# Patient Record
Sex: MALE | Race: WHITE | NOT HISPANIC OR LATINO | Employment: FULL TIME | URBAN - METROPOLITAN AREA
[De-identification: names, ages, dates, MRNs, and addresses within clinical notes are randomized per-mention and may not be internally consistent; named-entity substitution may affect disease eponyms.]

---

## 2024-10-22 ENCOUNTER — LAB REQUISITION (OUTPATIENT)
Dept: DERMATOPATHOLOGY | Facility: CLINIC | Age: 59
End: 2024-10-22
Payer: COMMERCIAL

## 2024-10-22 DIAGNOSIS — C43.59 MALIGNANT MELANOMA OF OTHER PART OF TRUNK: ICD-10-CM

## 2024-10-22 PROCEDURE — 88321 CONSLTJ&REPRT SLD PREP ELSWR: CPT | Performed by: DERMATOLOGY

## 2024-10-23 NOTE — PROGRESS NOTES
Assessment and Plan:  Obie Mabry is a 59 y.o. male referred by Salvador Delgado with a newly diagnosed melanoma of the right upper back that is at least 1.2 mm thick, pT2a. After discussing the risks and benefits of wide local excision with sentinel lymph node biopsy the patient understands and would like to proceed. We will look for OR time at [facility] in the coming weeks. I believe this will close primarily with a 1 cm margin.     I spent 60 minutes in the professional and overall care of this patient.    Rayray Piedra MD  Assistant Professor of Surgery  Division of Surgical Oncology  [phone]  [email or web address]      History Of Present Illness  Referring provider: Salvador Delgado  Diagnosis: melanoma  Location: right upper back  Thickness: at least 1.2 mm  Margins: positive deep and peripheral  Subtype: ss  High-risk features: none    All other systems have been reviewed and are negative except as noted in the HPI.    I personally reviewed all necessary laboratory results, pathology reports, and radiologic images for this patient.    Past Medical History  He has no past medical history on file.    Surgical History  He has no past surgical history on file.     Social History  He has no history on file for tobacco use, alcohol use, and drug use.    Family History  No family history on file.     Allergies  Patient has no allergy information on record.     Last Recorded Vitals  There were no vitals taken for this visit.    Physical Exam  General: no acute distress, well-nourished  Eyes: intact EOM, no scleral icterus  ENT: hearing intact, no drainage  Respiratory: symmetric chest rise, no cough  Cardiovascular: intact distal pulses, no pitting edema  Abdominal: Soft, nontender  Musculoskeletal: no deformities, intact strength  Integumentary: Healing biopsy site, no lymphadenopathy  Neuro: no focal deficits, sensation intact  Psych: normal mood and affect     Relevant Results  FINAL DIAGNOSIS   3  SLIDES, [facility], #[id number] (BX: 10/08/2024)     SKIN, RIGHT SUPERIOR UPPER BACK, SHAVE BIOPSY:  MALIGNANT MELANOMA, BRESLOW THICKNESS AT LEAST 1.2 MM, PRESENT ON THE DEEP AND PERIPHERAL MARGIN, SEE NOTE.     Note:  Microscopic examination reveals a specimen that extends into the superficial to mid dermis.  There is an asymmetric and poorly circumscribed proliferation of nested and single atypical melanocytes throughout all layers of the epidermis.  There are nests of atypical melanocytes in the dermis that have moderately  enlarged nuclei with moderate cytoplasm.  There are areas of papillary dermal edema with a lymphocytic infiltrate and edema.  The melanocytes stain with antibodies against SOX-10 and PRAME.       ** Electronically signed out by Bony Marley MD **            Electronically signed by Bony Marley MD on 10/24/2024 at 1158   Case Summary Report   MELANOMA OF THE SKIN: Biopsy   8th Edition - Protocol posted: 3/23/2022MELANOMA OF THE SKIN: BIOPSY - All Specimens  SPECIMEN   Procedure  Biopsy, shave   Specimen Laterality  Right   TUMOR   Tumor Site  Skin of trunk: Right superior upper back        Histologic Type  Superficial spreading melanoma (low-cumulative sun damage (CSD) melanoma)   Maximum Tumor (Breslow) Thickness (Millimeters)  At least: 1.2  mm     Moderately transected on the deep margin.   Ulceration  Not identified   Anatomic (Christoph) Level  At least level: IV     Moderately transected on the deep margin.   Mitotic Rate  None identified   Microsatellite(s)  Not identified   Lymphovascular Invasion  Not identified   Neurotropism  Not identified   Tumor-Infiltrating Lymphocytes  Present, nonbrisk   Tumor Regression  Present   MARGINS     Margin Status for Invasive Melanoma  Invasive melanoma present at margin   Margin(s) Involved by Invasive Melanoma  Deep   Margin Status for Melanoma in situ  Melanoma in situ present at margin   Margin(s) Involved by  Melanoma in Situ  Peripheral     Deep   PATHOLOGIC STAGE CLASSIFICATION (pTNM, AJCC 8th Edition)     pT Category  pT2a

## 2024-10-23 NOTE — H&P (VIEW-ONLY)
Assessment and Plan:  Obie Mabry is a 59 y.o. male referred by Salvador Delgado with a newly diagnosed melanoma of the right upper back that is at least 1.2 mm thick, pT2a. After discussing the risks and benefits of wide local excision with sentinel lymph node biopsy the patient understands and would like to proceed. We will look for OR time at [facility] in the coming weeks. I believe this will close primarily with a 1 cm margin.     I spent 60 minutes in the professional and overall care of this patient.    Rayray Piedra MD  Assistant Professor of Surgery  Division of Surgical Oncology  [phone]  [email or web address]      History Of Present Illness  Referring provider: Salvador Delgado  Diagnosis: melanoma  Location: right upper back  Thickness: at least 1.2 mm  Margins: positive deep and peripheral  Subtype: ss  High-risk features: none    All other systems have been reviewed and are negative except as noted in the HPI.    I personally reviewed all necessary laboratory results, pathology reports, and radiologic images for this patient.    Past Medical History  He has no past medical history on file.    Surgical History  He has no past surgical history on file.     Social History  He has no history on file for tobacco use, alcohol use, and drug use.    Family History  No family history on file.     Allergies  Patient has no allergy information on record.     Last Recorded Vitals  There were no vitals taken for this visit.    Physical Exam  General: no acute distress, well-nourished  Eyes: intact EOM, no scleral icterus  ENT: hearing intact, no drainage  Respiratory: symmetric chest rise, no cough  Cardiovascular: intact distal pulses, no pitting edema  Abdominal: Soft, nontender  Musculoskeletal: no deformities, intact strength  Integumentary: Healing biopsy site, no lymphadenopathy  Neuro: no focal deficits, sensation intact  Psych: normal mood and affect     Relevant Results  FINAL DIAGNOSIS   3  SLIDES, [facility], #[id number] (BX: 10/08/2024)     SKIN, RIGHT SUPERIOR UPPER BACK, SHAVE BIOPSY:  MALIGNANT MELANOMA, BRESLOW THICKNESS AT LEAST 1.2 MM, PRESENT ON THE DEEP AND PERIPHERAL MARGIN, SEE NOTE.     Note:  Microscopic examination reveals a specimen that extends into the superficial to mid dermis.  There is an asymmetric and poorly circumscribed proliferation of nested and single atypical melanocytes throughout all layers of the epidermis.  There are nests of atypical melanocytes in the dermis that have moderately  enlarged nuclei with moderate cytoplasm.  There are areas of papillary dermal edema with a lymphocytic infiltrate and edema.  The melanocytes stain with antibodies against SOX-10 and PRAME.       ** Electronically signed out by Bony Marley MD **            Electronically signed by Bony Marley MD on 10/24/2024 at 1158   Case Summary Report   MELANOMA OF THE SKIN: Biopsy   8th Edition - Protocol posted: 3/23/2022MELANOMA OF THE SKIN: BIOPSY - All Specimens  SPECIMEN   Procedure  Biopsy, shave   Specimen Laterality  Right   TUMOR   Tumor Site  Skin of trunk: Right superior upper back        Histologic Type  Superficial spreading melanoma (low-cumulative sun damage (CSD) melanoma)   Maximum Tumor (Breslow) Thickness (Millimeters)  At least: 1.2  mm     Moderately transected on the deep margin.   Ulceration  Not identified   Anatomic (Christoph) Level  At least level: IV     Moderately transected on the deep margin.   Mitotic Rate  None identified   Microsatellite(s)  Not identified   Lymphovascular Invasion  Not identified   Neurotropism  Not identified   Tumor-Infiltrating Lymphocytes  Present, nonbrisk   Tumor Regression  Present   MARGINS     Margin Status for Invasive Melanoma  Invasive melanoma present at margin   Margin(s) Involved by Invasive Melanoma  Deep   Margin Status for Melanoma in situ  Melanoma in situ present at margin   Margin(s) Involved by  Melanoma in Situ  Peripheral     Deep   PATHOLOGIC STAGE CLASSIFICATION (pTNM, AJCC 8th Edition)     pT Category  pT2a

## 2024-10-24 ENCOUNTER — OFFICE VISIT (OUTPATIENT)
Dept: SURGICAL ONCOLOGY | Facility: HOSPITAL | Age: 59
End: 2024-10-24
Payer: COMMERCIAL

## 2024-10-24 VITALS
RESPIRATION RATE: 20 BRPM | OXYGEN SATURATION: 100 % | TEMPERATURE: 97 F | WEIGHT: 274.47 LBS | SYSTOLIC BLOOD PRESSURE: 142 MMHG | HEART RATE: 100 BPM | DIASTOLIC BLOOD PRESSURE: 73 MMHG

## 2024-10-24 DIAGNOSIS — C43.59 MELANOMA OF BACK (MULTI): Primary | ICD-10-CM

## 2024-10-24 LAB
PATH REPORT.FINAL DX SPEC: NORMAL
PATH REPORT.GROSS SPEC: NORMAL
PATH REPORT.RELEVANT HX SPEC: NORMAL
PATH REPORT.TOTAL CANCER: NORMAL
PATHOLOGY SYNOPTIC REPORT: NORMAL

## 2024-10-24 PROCEDURE — 99214 OFFICE O/P EST MOD 30 MIN: CPT | Mod: 57 | Performed by: STUDENT IN AN ORGANIZED HEALTH CARE EDUCATION/TRAINING PROGRAM

## 2024-10-24 PROCEDURE — 4004F PT TOBACCO SCREEN RCVD TLK: CPT | Performed by: STUDENT IN AN ORGANIZED HEALTH CARE EDUCATION/TRAINING PROGRAM

## 2024-10-24 PROCEDURE — 99204 OFFICE O/P NEW MOD 45 MIN: CPT | Performed by: STUDENT IN AN ORGANIZED HEALTH CARE EDUCATION/TRAINING PROGRAM

## 2024-10-24 ASSESSMENT — PATIENT HEALTH QUESTIONNAIRE - PHQ9
SUM OF ALL RESPONSES TO PHQ9 QUESTIONS 1 AND 2: 0
1. LITTLE INTEREST OR PLEASURE IN DOING THINGS: NOT AT ALL
2. FEELING DOWN, DEPRESSED OR HOPELESS: NOT AT ALL

## 2024-10-24 ASSESSMENT — PAIN SCALES - GENERAL: PAINLEVEL_OUTOF10: 6

## 2024-10-24 NOTE — LETTER
October 24, 2024     Salvador Delgado MD  [address]  [address]  [facility] [address]    Patient: Obie Mabry   YOB: 1965   Date of Visit: 10/24/2024       Dear Dr. Salvador Delgado MD:    Thank you for referring Obie Mabry to me for evaluation. Below are my notes for this consultation.  If you have questions, please do not hesitate to call me. I look forward to following your patient along with you.       Sincerely,     Rayray Piedra MD      CC: No Recipients  ______________________________________________________________________________________    Assessment and Plan:  Obie Mabry is a 59 y.o. male referred by Salvador Delgado with a newly diagnosed melanoma of the right upper back that is at least 1.2 mm thick, pT2a. After discussing the risks and benefits of wide local excision with sentinel lymph node biopsy the patient understands and would like to proceed. We will look for OR time at [place] in the coming weeks. I believe this will close primarily with a 1 cm margin.     I spent 60 minutes in the professional and overall care of this patient.    Rayray Piedra MD  Assistant Professor of Surgery  Division of Surgical Oncology  [phone]  [email or web address]      History Of Present Illness  Referring provider: Salvador Delgado  Diagnosis: melanoma  Location: right upper back  Thickness: at least 1.2 mm  Margins: positive deep and peripheral  Subtype: ss  High-risk features: none    All other systems have been reviewed and are negative except as noted in the HPI.    I personally reviewed all necessary laboratory results, pathology reports, and radiologic images for this patient.    Past Medical History  He has no past medical history on file.    Surgical History  He has no past surgical history on file.     Social History  He has no history on file for tobacco use, alcohol use, and drug use.    Family History  No family history on file.     Allergies  Patient has no  allergy information on record.     Last Recorded Vitals  There were no vitals taken for this visit.    Physical Exam  General: no acute distress, well-nourished  Eyes: intact EOM, no scleral icterus  ENT: hearing intact, no drainage  Respiratory: symmetric chest rise, no cough  Cardiovascular: intact distal pulses, no pitting edema  Abdominal: Soft, nontender  Musculoskeletal: no deformities, intact strength  Integumentary: Healing biopsy site, no lymphadenopathy  Neuro: no focal deficits, sensation intact  Psych: normal mood and affect     Relevant Results  FINAL DIAGNOSIS   3 SLIDES, [facility], #[id number] (BX: 10/08/2024)     SKIN, RIGHT SUPERIOR UPPER BACK, SHAVE BIOPSY:  MALIGNANT MELANOMA, BRESLOW THICKNESS AT LEAST 1.2 MM, PRESENT ON THE DEEP AND PERIPHERAL MARGIN, SEE NOTE.     Note:  Microscopic examination reveals a specimen that extends into the superficial to mid dermis.  There is an asymmetric and poorly circumscribed proliferation of nested and single atypical melanocytes throughout all layers of the epidermis.  There are nests of atypical melanocytes in the dermis that have moderately  enlarged nuclei with moderate cytoplasm.  There are areas of papillary dermal edema with a lymphocytic infiltrate and edema.  The melanocytes stain with antibodies against SOX-10 and PRAME.       ** Electronically signed out by Bony Marley MD **            Electronically signed by Bony Marley MD on 10/24/2024 at 1158   Case Summary Report   MELANOMA OF THE SKIN: Biopsy   8th Edition - Protocol posted: 3/23/2022MELANOMA OF THE SKIN: BIOPSY - All Specimens  SPECIMEN   Procedure  Biopsy, shave   Specimen Laterality  Right   TUMOR   Tumor Site  Skin of trunk: Right superior upper back        Histologic Type  Superficial spreading melanoma (low-cumulative sun damage (CSD) melanoma)   Maximum Tumor (Breslow) Thickness (Millimeters)  At least: 1.2  mm     Moderately transected on the deep  margin.   Ulceration  Not identified   Anatomic (Christoph) Level  At least level: IV     Moderately transected on the deep margin.   Mitotic Rate  None identified   Microsatellite(s)  Not identified   Lymphovascular Invasion  Not identified   Neurotropism  Not identified   Tumor-Infiltrating Lymphocytes  Present, nonbrisk   Tumor Regression  Present   MARGINS     Margin Status for Invasive Melanoma  Invasive melanoma present at margin   Margin(s) Involved by Invasive Melanoma  Deep   Margin Status for Melanoma in situ  Melanoma in situ present at margin   Margin(s) Involved by Melanoma in Situ  Peripheral     Deep   PATHOLOGIC STAGE CLASSIFICATION (pTNM, AJCC 8th Edition)     pT Category  pT2a

## 2024-10-28 ENCOUNTER — TUMOR BOARD CONFERENCE (OUTPATIENT)
Dept: HEMATOLOGY/ONCOLOGY | Facility: HOSPITAL | Age: 59
End: 2024-10-28
Payer: COMMERCIAL

## 2024-10-30 RX ORDER — ASPIRIN 81 MG/1
81 TABLET ORAL DAILY
COMMUNITY

## 2024-10-30 RX ORDER — PRAVASTATIN SODIUM 80 MG/1
1 TABLET ORAL NIGHTLY
COMMUNITY
Start: 2024-06-16

## 2024-10-30 RX ORDER — ACETAMINOPHEN 500 MG
2000 TABLET ORAL DAILY
COMMUNITY

## 2024-10-30 RX ORDER — TRIAMTERENE AND HYDROCHLOROTHIAZIDE 37.5; 25 MG/1; MG/1
1 CAPSULE ORAL
COMMUNITY
Start: 2023-09-20

## 2024-10-30 RX ORDER — IBUPROFEN 800 MG/1
800 TABLET ORAL EVERY 6 HOURS PRN
COMMUNITY

## 2024-10-30 RX ORDER — UBIDECARENONE 30 MG
30 CAPSULE ORAL DAILY
COMMUNITY

## 2024-10-30 NOTE — PREPROCEDURE INSTRUCTIONS

## 2024-11-04 ENCOUNTER — HOSPITAL ENCOUNTER (OUTPATIENT)
Dept: RADIOLOGY | Facility: HOSPITAL | Age: 59
Setting detail: OUTPATIENT SURGERY
Discharge: HOME | End: 2024-11-04
Payer: COMMERCIAL

## 2024-11-04 ENCOUNTER — APPOINTMENT (OUTPATIENT)
Dept: CARDIOLOGY | Facility: HOSPITAL | Age: 59
End: 2024-11-04
Payer: COMMERCIAL

## 2024-11-04 ENCOUNTER — ANESTHESIA (OUTPATIENT)
Dept: OPERATING ROOM | Facility: HOSPITAL | Age: 59
End: 2024-11-04
Payer: COMMERCIAL

## 2024-11-04 ENCOUNTER — HOSPITAL ENCOUNTER (OUTPATIENT)
Facility: HOSPITAL | Age: 59
Setting detail: OUTPATIENT SURGERY
Discharge: HOME | End: 2024-11-04
Attending: STUDENT IN AN ORGANIZED HEALTH CARE EDUCATION/TRAINING PROGRAM | Admitting: STUDENT IN AN ORGANIZED HEALTH CARE EDUCATION/TRAINING PROGRAM
Payer: COMMERCIAL

## 2024-11-04 ENCOUNTER — ANESTHESIA EVENT (OUTPATIENT)
Dept: OPERATING ROOM | Facility: HOSPITAL | Age: 59
End: 2024-11-04
Payer: COMMERCIAL

## 2024-11-04 VITALS
HEART RATE: 88 BPM | HEIGHT: 68 IN | WEIGHT: 272.05 LBS | DIASTOLIC BLOOD PRESSURE: 68 MMHG | SYSTOLIC BLOOD PRESSURE: 169 MMHG | BODY MASS INDEX: 41.23 KG/M2 | TEMPERATURE: 96.8 F | OXYGEN SATURATION: 97 % | RESPIRATION RATE: 18 BRPM

## 2024-11-04 DIAGNOSIS — C43.59 MELANOMA OF BACK (MULTI): ICD-10-CM

## 2024-11-04 DIAGNOSIS — C43.59 MELANOMA OF BACK (MULTI): Primary | ICD-10-CM

## 2024-11-04 PROBLEM — I10 HTN (HYPERTENSION): Status: ACTIVE | Noted: 2024-11-04

## 2024-11-04 PROBLEM — E78.5 HYPERLIPIDEMIA: Status: ACTIVE | Noted: 2024-11-04

## 2024-11-04 PROCEDURE — 3700000001 HC GENERAL ANESTHESIA TIME - INITIAL BASE CHARGE: Performed by: STUDENT IN AN ORGANIZED HEALTH CARE EDUCATION/TRAINING PROGRAM

## 2024-11-04 PROCEDURE — 2500000004 HC RX 250 GENERAL PHARMACY W/ HCPCS (ALT 636 FOR OP/ED)

## 2024-11-04 PROCEDURE — 7100000002 HC RECOVERY ROOM TIME - EACH INCREMENTAL 1 MINUTE: Performed by: STUDENT IN AN ORGANIZED HEALTH CARE EDUCATION/TRAINING PROGRAM

## 2024-11-04 PROCEDURE — 3600000003 HC OR TIME - INITIAL BASE CHARGE - PROCEDURE LEVEL THREE: Performed by: STUDENT IN AN ORGANIZED HEALTH CARE EDUCATION/TRAINING PROGRAM

## 2024-11-04 PROCEDURE — 7100000001 HC RECOVERY ROOM TIME - INITIAL BASE CHARGE: Performed by: STUDENT IN AN ORGANIZED HEALTH CARE EDUCATION/TRAINING PROGRAM

## 2024-11-04 PROCEDURE — 38525 BIOPSY/REMOVAL LYMPH NODES: CPT | Performed by: STUDENT IN AN ORGANIZED HEALTH CARE EDUCATION/TRAINING PROGRAM

## 2024-11-04 PROCEDURE — 2720000007 HC OR 272 NO HCPCS: Performed by: STUDENT IN AN ORGANIZED HEALTH CARE EDUCATION/TRAINING PROGRAM

## 2024-11-04 PROCEDURE — 3700000002 HC GENERAL ANESTHESIA TIME - EACH INCREMENTAL 1 MINUTE: Performed by: STUDENT IN AN ORGANIZED HEALTH CARE EDUCATION/TRAINING PROGRAM

## 2024-11-04 PROCEDURE — 3600000008 HC OR TIME - EACH INCREMENTAL 1 MINUTE - PROCEDURE LEVEL THREE: Performed by: STUDENT IN AN ORGANIZED HEALTH CARE EDUCATION/TRAINING PROGRAM

## 2024-11-04 PROCEDURE — 13101 CMPLX RPR TRUNK 2.6-7.5 CM: CPT | Performed by: STUDENT IN AN ORGANIZED HEALTH CARE EDUCATION/TRAINING PROGRAM

## 2024-11-04 PROCEDURE — 78830 RP LOCLZJ TUM SPECT W/CT 1: CPT

## 2024-11-04 PROCEDURE — 7100000009 HC PHASE TWO TIME - INITIAL BASE CHARGE: Performed by: STUDENT IN AN ORGANIZED HEALTH CARE EDUCATION/TRAINING PROGRAM

## 2024-11-04 PROCEDURE — 93005 ELECTROCARDIOGRAM TRACING: CPT

## 2024-11-04 PROCEDURE — 38900 IO MAP OF SENT LYMPH NODE: CPT | Performed by: STUDENT IN AN ORGANIZED HEALTH CARE EDUCATION/TRAINING PROGRAM

## 2024-11-04 PROCEDURE — 2500000005 HC RX 250 GENERAL PHARMACY W/O HCPCS: Performed by: STUDENT IN AN ORGANIZED HEALTH CARE EDUCATION/TRAINING PROGRAM

## 2024-11-04 PROCEDURE — 2500000004 HC RX 250 GENERAL PHARMACY W/ HCPCS (ALT 636 FOR OP/ED): Performed by: STUDENT IN AN ORGANIZED HEALTH CARE EDUCATION/TRAINING PROGRAM

## 2024-11-04 PROCEDURE — 3430000001 HC RX 343 DIAGNOSTIC RADIOPHARMACEUTICALS: Performed by: INTERNAL MEDICINE

## 2024-11-04 PROCEDURE — A9520 TC99 TILMANOCEPT DIAG 0.5MCI: HCPCS | Performed by: INTERNAL MEDICINE

## 2024-11-04 PROCEDURE — 7100000010 HC PHASE TWO TIME - EACH INCREMENTAL 1 MINUTE: Performed by: STUDENT IN AN ORGANIZED HEALTH CARE EDUCATION/TRAINING PROGRAM

## 2024-11-04 PROCEDURE — 2500000004 HC RX 250 GENERAL PHARMACY W/ HCPCS (ALT 636 FOR OP/ED): Performed by: ANESTHESIOLOGY

## 2024-11-04 PROCEDURE — 2500000005 HC RX 250 GENERAL PHARMACY W/O HCPCS: Performed by: ANESTHESIOLOGY

## 2024-11-04 PROCEDURE — 11606 EXC TR-EXT MAL+MARG >4 CM: CPT | Performed by: STUDENT IN AN ORGANIZED HEALTH CARE EDUCATION/TRAINING PROGRAM

## 2024-11-04 PROCEDURE — 13102 CMPLX RPR TRUNK ADDL 5CM/<: CPT | Performed by: STUDENT IN AN ORGANIZED HEALTH CARE EDUCATION/TRAINING PROGRAM

## 2024-11-04 RX ORDER — SUCCINYLCHOLINE CHLORIDE 100 MG/5ML
SYRINGE (ML) INTRAVENOUS AS NEEDED
Status: DISCONTINUED | OUTPATIENT
Start: 2024-11-04 | End: 2024-11-04

## 2024-11-04 RX ORDER — ROCURONIUM BROMIDE 10 MG/ML
INJECTION, SOLUTION INTRAVENOUS AS NEEDED
Status: DISCONTINUED | OUTPATIENT
Start: 2024-11-04 | End: 2024-11-04

## 2024-11-04 RX ORDER — OXYCODONE HYDROCHLORIDE 5 MG/1
5 TABLET ORAL EVERY 4 HOURS PRN
Status: CANCELLED | OUTPATIENT
Start: 2024-11-04

## 2024-11-04 RX ORDER — ALBUTEROL SULFATE 0.83 MG/ML
2.5 SOLUTION RESPIRATORY (INHALATION) ONCE
Status: DISCONTINUED | OUTPATIENT
Start: 2024-11-04 | End: 2024-11-04 | Stop reason: HOSPADM

## 2024-11-04 RX ORDER — SODIUM CHLORIDE, SODIUM LACTATE, POTASSIUM CHLORIDE, CALCIUM CHLORIDE 600; 310; 30; 20 MG/100ML; MG/100ML; MG/100ML; MG/100ML
100 INJECTION, SOLUTION INTRAVENOUS CONTINUOUS
Status: DISCONTINUED | OUTPATIENT
Start: 2024-11-04 | End: 2024-11-04 | Stop reason: HOSPADM

## 2024-11-04 RX ORDER — CEFAZOLIN SODIUM IN 0.9 % NACL 3 G/100 ML
3 INTRAVENOUS SOLUTION, PIGGYBACK (ML) INTRAVENOUS ONCE
Status: COMPLETED | OUTPATIENT
Start: 2024-11-04 | End: 2024-11-04

## 2024-11-04 RX ORDER — FENTANYL CITRATE 50 UG/ML
INJECTION, SOLUTION INTRAMUSCULAR; INTRAVENOUS AS NEEDED
Status: DISCONTINUED | OUTPATIENT
Start: 2024-11-04 | End: 2024-11-04

## 2024-11-04 RX ORDER — SODIUM CHLORIDE 0.9 G/100ML
IRRIGANT IRRIGATION AS NEEDED
Status: DISCONTINUED | OUTPATIENT
Start: 2024-11-04 | End: 2024-11-04 | Stop reason: HOSPADM

## 2024-11-04 RX ORDER — MIDAZOLAM HYDROCHLORIDE 1 MG/ML
1 INJECTION, SOLUTION INTRAMUSCULAR; INTRAVENOUS ONCE AS NEEDED
Status: DISCONTINUED | OUTPATIENT
Start: 2024-11-04 | End: 2024-11-04 | Stop reason: HOSPADM

## 2024-11-04 RX ORDER — LABETALOL HYDROCHLORIDE 5 MG/ML
5 INJECTION, SOLUTION INTRAVENOUS ONCE AS NEEDED
Status: DISCONTINUED | OUTPATIENT
Start: 2024-11-04 | End: 2024-11-04 | Stop reason: HOSPADM

## 2024-11-04 RX ORDER — GLYCOPYRROLATE 0.2 MG/ML
INJECTION INTRAMUSCULAR; INTRAVENOUS AS NEEDED
Status: DISCONTINUED | OUTPATIENT
Start: 2024-11-04 | End: 2024-11-04

## 2024-11-04 RX ORDER — LIDOCAINE HYDROCHLORIDE 10 MG/ML
0.1 INJECTION, SOLUTION EPIDURAL; INFILTRATION; INTRACAUDAL; PERINEURAL ONCE
Status: DISCONTINUED | OUTPATIENT
Start: 2024-11-04 | End: 2024-11-04 | Stop reason: HOSPADM

## 2024-11-04 RX ORDER — TRAMADOL HYDROCHLORIDE 50 MG/1
50 TABLET ORAL 2 TIMES DAILY PRN
Qty: 5 TABLET | Refills: 0 | Status: SHIPPED | OUTPATIENT
Start: 2024-11-04 | End: 2024-11-09

## 2024-11-04 RX ORDER — CETIRIZINE HYDROCHLORIDE 10 MG/1
10 TABLET, CHEWABLE ORAL DAILY
COMMUNITY

## 2024-11-04 RX ORDER — DROPERIDOL 2.5 MG/ML
0.62 INJECTION, SOLUTION INTRAMUSCULAR; INTRAVENOUS ONCE AS NEEDED
Status: DISCONTINUED | OUTPATIENT
Start: 2024-11-04 | End: 2024-11-04 | Stop reason: HOSPADM

## 2024-11-04 RX ORDER — ONDANSETRON HYDROCHLORIDE 2 MG/ML
INJECTION, SOLUTION INTRAVENOUS AS NEEDED
Status: DISCONTINUED | OUTPATIENT
Start: 2024-11-04 | End: 2024-11-04

## 2024-11-04 RX ORDER — LIDOCAINE HYDROCHLORIDE 20 MG/ML
INJECTION, SOLUTION INFILTRATION; PERINEURAL AS NEEDED
Status: DISCONTINUED | OUTPATIENT
Start: 2024-11-04 | End: 2024-11-04

## 2024-11-04 RX ORDER — MEPERIDINE HYDROCHLORIDE 25 MG/ML
12.5 INJECTION INTRAMUSCULAR; INTRAVENOUS; SUBCUTANEOUS EVERY 10 MIN PRN
Status: DISCONTINUED | OUTPATIENT
Start: 2024-11-04 | End: 2024-11-04 | Stop reason: HOSPADM

## 2024-11-04 RX ORDER — PHENYLEPHRINE HCL IN 0.9% NACL 1 MG/10 ML
SYRINGE (ML) INTRAVENOUS AS NEEDED
Status: DISCONTINUED | OUTPATIENT
Start: 2024-11-04 | End: 2024-11-04

## 2024-11-04 RX ORDER — PROPOFOL 10 MG/ML
INJECTION, EMULSION INTRAVENOUS AS NEEDED
Status: DISCONTINUED | OUTPATIENT
Start: 2024-11-04 | End: 2024-11-04

## 2024-11-04 SDOH — HEALTH STABILITY: MENTAL HEALTH: CURRENT SMOKER: 0

## 2024-11-04 ASSESSMENT — PAIN SCALES - GENERAL
PAINLEVEL_OUTOF10: 0 - NO PAIN
PAINLEVEL_OUTOF10: 6
PAIN_LEVEL: 0
PAINLEVEL_OUTOF10: 0 - NO PAIN
PAINLEVEL_OUTOF10: 0 - NO PAIN

## 2024-11-04 ASSESSMENT — PAIN - FUNCTIONAL ASSESSMENT
PAIN_FUNCTIONAL_ASSESSMENT: 0-10

## 2024-11-04 ASSESSMENT — COLUMBIA-SUICIDE SEVERITY RATING SCALE - C-SSRS
1. IN THE PAST MONTH, HAVE YOU WISHED YOU WERE DEAD OR WISHED YOU COULD GO TO SLEEP AND NOT WAKE UP?: NO
2. HAVE YOU ACTUALLY HAD ANY THOUGHTS OF KILLING YOURSELF?: NO
6. HAVE YOU EVER DONE ANYTHING, STARTED TO DO ANYTHING, OR PREPARED TO DO ANYTHING TO END YOUR LIFE?: NO

## 2024-11-04 ASSESSMENT — PAIN DESCRIPTION - DESCRIPTORS: DESCRIPTORS: SHARP;SORE

## 2024-11-04 NOTE — ANESTHESIA POSTPROCEDURE EVALUATION
Patient: Obie COLE Raghavendra    Procedure Summary       Date: 11/04/24 Room / Location: [facility] OR 10 / [facility]    Anesthesia Start: 1422 Anesthesia Stop: 1637    Procedures:       Excision Lesion Back      RIGHT AXILLARY SENTINEL LYMPH NODE BIOPSY (Right) Diagnosis:       Melanoma of back (Multi)      (Melanoma of back (Multi) [C43.59])    Surgeons: Rayray Piedra MD Responsible Provider: Johnnie Gupta MD    Anesthesia Type: general ASA Status: 2            Anesthesia Type: general    Vitals Value Taken Time   /99 11/04/24 1637   Temp 36.2 11/04/24 1637   Pulse 86 11/04/24 1637   Resp 20 11/04/24 1637   SpO2 98 11/04/24 1637       Anesthesia Post Evaluation    Patient location during evaluation: PACU  Patient participation: complete - patient participated  Level of consciousness: awake  Pain score: 0  Pain management: adequate  Multimodal analgesia pain management approach  Airway patency: patent  Cardiovascular status: acceptable  Respiratory status: acceptable and face mask  Hydration status: acceptable  Postoperative Nausea and Vomiting: none        No notable events documented.

## 2024-11-04 NOTE — DISCHARGE INSTRUCTIONS
Melanoma Surgery Discharge Instructions    Diet  No diet restrictions    Pain control  For baseline pain control: Tylenol (acetaminophen) 1,000 mg every 8 hours for one week  For mild pain: ibuprofen 600 mg every 8 hours with food as needed  For moderate to severe pain: Tramadol as prescribed    Activity  No specific lifting or activity restrictions  In general, listen to your body and do not overly stress your surgical sites    Incisions  Your incisions are covered with skin glue. This is a sterile dressing placed in the operating room. Do not pick or peel it off. This will fall off in 2-3 weeks.  No dressing changes or wound care is needed. Do not use any ointments or cleaning agents.  You have multiple layers of sutures under the skin that will dissolve.   If you have sutures through the skin they will be removed at your postoperative appointment in 3 weeks.  You may shower the day after surgery. Let soap and water wash over the incision and pat it dry.   No going underwater (bath, pool, lake, ocean, etc.) for 2 weeks.  If you notice any of the following, please call [facility] ([phone]) or Dr. Piedra's office ([phone]).  Swelling under the incision like a golf ball or larger.  Redness of the skin that is spreading away from the incision.  Drainage from the incision.  Fevers, chills, or any other concerning symptoms.    Follow-up  Call [facility] at [phone] to arrange a postop visit in 2-3 weeks  If you have sutures through your skin, these should be removed at 3 weeks  Dr. Piedra will discuss your pathology and Tumor Board recommendations at your postop visit. You may see your pathology online prior the visit. Write down any questions you have and bring them to your postop visit.

## 2024-11-04 NOTE — ANESTHESIA PROCEDURE NOTES
Airway  Date/Time: 11/4/2024 2:31 PM  Urgency: elective    Airway not difficult    Staffing  Performed: CRNA   Authorized by: Michael Maki MD    Performed by: KIRAN Purvis-NATHANIEL  Patient location during procedure: OR    Indications and Patient Condition  Indications for airway management: anesthesia and airway protection  Spontaneous Ventilation: absent  Sedation level: deep  Preoxygenated: yes  Patient position: sniffing  Mask difficulty assessment: 0 - not attempted    Final Airway Details  Final airway type: endotracheal airway      Successful airway: ETT  Cuffed: yes   Successful intubation technique: video laryngoscopy (Rader 4)  Facilitating devices/methods: intubating stylet  Endotracheal tube insertion site: oral  Blade size: #4  ETT size (mm): 8.0  Cormack-Lehane Classification: grade IIa - partial view of glottis  Placement verified by: chest auscultation and capnometry   Measured from: gums  ETT to gums (cm): 22  Number of attempts at approach: 1

## 2024-11-04 NOTE — ANESTHESIA PREPROCEDURE EVALUATION
Patient: Obie Mabry    Procedure Information       Date/Time: 11/04/24 1200    Procedures:       Excision Lesion Back - SNI @ 1030A      [place] Lymph Node Biopsy    Location: [facility] / [facility]    Surgeons: Rayray Piedra MD            Relevant Problems   Anesthesia (within normal limits)      Cardiac   (+) HTN (hypertension)   (+) Hyperlipidemia      Pulmonary (within normal limits)      Neuro (within normal limits)      GI (within normal limits)      /Renal (within normal limits)      Liver (within normal limits)      Endocrine (within normal limits)      Hematology (within normal limits)      Musculoskeletal (within normal limits)      HEENT (within normal limits)      ID (within normal limits)      Skin (within normal limits)      GYN (within normal limits)       Clinical information reviewed:   Tobacco  Allergies  Meds   Med Hx  Surg Hx   Fam Hx  Soc Hx        NPO Detail:  NPO/Void Status  Date of Last Liquid: 11/04/24  Time of Last Liquid: 0700  Date of Last Solid: 11/03/24  Time of Last Solid: 2000  Last Intake Type: Clear fluids  Time of Last Void: 0915         Physical Exam    Airway  Mallampati: II     Cardiovascular - normal exam  Rhythm: regular     Dental - normal exam     Pulmonary - normal exam     Abdominal - normal exam             Anesthesia Plan    History of general anesthesia?: yes  History of complications of general anesthesia?: yes    ASA 2     general     The patient is not a current smoker.    intravenous induction   Anesthetic plan and risks discussed with patient.    Plan discussed with CRNA.

## 2024-11-04 NOTE — OP NOTE
Excision Lesion Back, RIGHT AXILLARY SENTINEL LYMPH NODE BIOPSY (R) Operative Note     Date: 2024  OR Location: [facility]    Name: Obie Mabry, : 1965, Age: 59 y.o., MRN: [id number], Sex: male    Diagnosis  Pre-op Diagnosis      * Melanoma of back (Multi) [C43.59] Post-op Diagnosis     * Melanoma of back (Multi) [C43.59]     Procedures  Excision Lesion Back  11328 - [place] EXCISION TUMOR SOFT TIS BACK/FLANK SUBQ 3 CM/>    RIGHT AXILLARY SENTINEL LYMPH NODE BIOPSY  29227 - [place] BX/EXC LYMPH NODE OPEN DEEP AXILLARY NODE      Surgeons      * Rayray Piedra - Primary    Resident/Fellow/Other Assistant:  Surgeons and Role:     * Megan Garcia PA-C - Assisting    Staff:   Circulator: Eveline Trejo Person: Kajal  Circulator: Maverick    Anesthesia Staff: Anesthesiologist: Johnnie Gupta MD; Michael Maki MD  CRNA: Nori Han APRN-CRNA; Rosalina Carvalho APRN-CRNA, DNAP    Procedure Summary  Anesthesia: General  ASA: II  Estimated Blood Loss: 10mL  Intra-op Medications: Administrations occurring from 1200 to 1400 on 24:  * No intraprocedure medications in log *           Anesthesia Record               Intraprocedure I/O Totals       None           Specimen:   ID Type Source Tests Collected by Time   1 : WIDE LOCAL SKIN EXCISION BACK (3X9 CM) Tissue SKIN WIDE EXCISION DERMPATH LAB- DERMATOPATHOLOGY Rayray Piedra MD 2024 1457   2 : RIGHT AXILLARY SENTINEL LYMPH NODE #1 (COUNT-1207) Tissue SENTINEL LYMPH NODE MELANOMA DERMPATH LAB- DERMATOPATHOLOGY Rayray Piedra MD 2024 1603            INDICATIONS FOR SURGERY  Referring provider: Salvador Delgado  Diagnosis: melanoma  Location: right upper back  Thickness: at least 1.2 mm      DETAILS OF PROCEDURE    Wide Local Excision for Primary Cutaneous Melanoma  Operation performed with curative intent Yes   Original Breslow thickness of the lesion 1.2 mm (to the tenth of a millimeter)   Clinical margin width  1 cm   Depth of excision  Full-thickness skin/subcutaneous tissue down to fascia (melanoma)     Final dimensions of excision: 3 cm x 9 cm  Specimen marking stitches: short superior, long left  Excision closure: 4-0 monocryl and Dermabond  Location of sentinel nodes: right axilla  Number of sentinel lymph nodes: 1    The patient arrived at [facility] for the aforementioned procedure. Consent was obtained, history and physical performed, and questions were answered. The patient was moved into the operating room and induced under anesthesia. A timeout was performed prior to surgery.       For the wide local excision, the surgical site was prepped and draped in the usual sterile fashion. A margin was drawn about the lesion and this was extended into a 3:1 elliptical incision along natural body lines to facilitate a tension-free closure. Local anesthetic was injected and an incision was made along this ellipse. Electrocautery was used to dissected down to the muscular fascia and the specimen was then removed and oriented with sutures as indicated above. This was sent for permanent dermatopathology. Circumferential soft tissue flaps were created to facilitate closure. The wound was irrigated and hemostasis was achieved. The wound was then closed in a complex multilayer fashion using deep 2-0 Vicryl [id number], interrupted 3-0 Vicryl deep dermals, and a cutaneous closure indicated above. The skin was dressed with Dermabond.       For the sentinel lymph node biopsy, the lymph node basin was prepped and draped in the usual sterile fashion after verifying radiotracer presence in this basin with the neoprobe. A 3 cm incision was made over the radioactivity and dissection was carried out with electrocautery to identify the sentinel node. The node/nodes was/were removed using clips and suture as needed to control blood vessels and lymphatics and sent for permanent pathology. The wound was then irrigated and hemostasis achieved. This was  closed in a multilayer fashion using a deep 2-0 Vicryl figure-of-eight, interrupted deep 3-0 Vicryl, and a running subcuticular 4-0 Monocryl. The skin was dressed with Dermabond.       The patient was awoken and returned to the PACU in anticipation of discharge home. I was present scrubbed and directed all operative decision-making.

## 2024-11-06 LAB
ATRIAL RATE: 75 BPM
P AXIS: 52 DEGREES
P OFFSET: 188 MS
P ONSET: 140 MS
PR INTERVAL: 140 MS
Q ONSET: 210 MS
QRS COUNT: 12 BEATS
QRS DURATION: 94 MS
QT INTERVAL: 390 MS
QTC CALCULATION(BAZETT): 435 MS
QTC FREDERICIA: 419 MS
R AXIS: -21 DEGREES
T AXIS: 29 DEGREES
T OFFSET: 405 MS
VENTRICULAR RATE: 75 BPM

## 2024-11-08 LAB
LAB AP ASR DISCLAIMER: NORMAL
LABORATORY COMMENT REPORT: NORMAL
PATH REPORT.FINAL DX SPEC: NORMAL
PATH REPORT.GROSS SPEC: NORMAL
PATH REPORT.RELEVANT HX SPEC: NORMAL
PATH REPORT.TOTAL CANCER: NORMAL

## 2024-11-11 ENCOUNTER — TUMOR BOARD CONFERENCE (OUTPATIENT)
Dept: HEMATOLOGY/ONCOLOGY | Facility: HOSPITAL | Age: 59
End: 2024-11-11
Payer: COMMERCIAL

## 2024-11-11 DIAGNOSIS — C43.59 MELANOMA OF BACK (MULTI): Primary | ICD-10-CM

## 2024-11-11 NOTE — TUMOR BOARD NOTE
General Patient Information  Name:  Obie Mabry  Evaluation #:  2  Conference Date:  11/11/2024  YOB: 1965  MRN:  [id number]  Program Physician(s):  Sourav Cheng  Referring Physician(s):  Salvador Kemp      Summary   Stage:  cIB ([id number])   Melanoma 5 year survival: 97%    Assessment:  Right superior upper back with a non-ulcerated superficial spreading melanoma, Breslow of at least 1.2 mm, moderately transected on the deep margin.    S/p WLE with 1 cm margins and SLNB, showing atypical melanocytic hyperplasia, inked margins free in the planes of sections examined. SLNB showed 1 right axillary lymph node with focal SOX-10 staining, favored to be a benign node (0/1). Adequately surgically treated.    Recommendation: Annual H&P.    Review Multidisciplinary Cutaneous Oncology Conference recommendation with patient.  Continue routine follow up and total body skin exams with Salvador Delgado.    Follow Up:  Salvador Kemp      History and Physical Exam  Dermatologic History:   59 y.o. male with a biopsy of the right superior upper back on 10/8/2024 showing a non-ulcerated melanoma, superficial spreading type, Breslow: at least 1.2 mm, moderately transected on the deep margin    S/p WLE with 1 cm margins and SLNB on 11/4, showing atypical melanocytic hyperplasia, inked margins free in the planes of sections examined. While this specimen has architectural features of a dysplastic junctional nevus, the findings likely represent residual melanoma in situ. SLNB showed 1 right axillary lymph node with focal SOX-10 staining, favored to be a benign node (0/1).    Past Medical History:    Past Medical History:   Diagnosis Date    Arthritis     COVID-19     VACCINATED    Hyperlipidemia     Hypertension     Joint pain     Melanoma (Multi)        Family History of DNS/MM:  Unknown    Skin:  Healing biopsy site     Lymphatic:  no lymphadenopathy        Pathology  Dermatopathology- DERM LAB: [id number]   Collected 11/4/2024 14:57     A. SKIN, BACK (3X9 CM), WIDE EXCISION:  ATYPICAL MELANOCYTIC HYPERPLASIA, INKED MARGINS FREE IN THE PLANES OF SECTIONS EXAMINED, SEE NOTE.     Note: Microscopic examination reveals a specimen that extends into the subcutaneous fat. An area with horizontally oriented collagen and vertically oriented vessels is present. In the immediately adjacent epidermis in slides A10 through A14 there is a proliferation of nested and single atypical melanocytes along the dermal-epidermal junction with bridging of adjacent nests of melanocytes. Step sections were performed.     While this specimen has architectural features of a dysplastic junctional nevus, the findings likely represent residual melanoma in situ.     B. NODE, RIGHT AXILLARY SENTINEL LYMPH NODE #1 ([id number]), EXCISION:  FOCAL SOX-10 POSITIVE STAINING, SEE NOTE.     Note: Microscopic examination reveals an enlarged lymph node. In the parenchymal area there are spindled SOX-10 positive cells that appear to be adjacent to a blood vessel. Immediately adjacent to this area there is an area of SOX-10 positive staining that partially overlaps a nucleus and seems to extend beyond the nucleus. Staining is only seen on one section of the SOX-10 in this area and is not seen on a Melan-A or HMB4 stain. All control slides stain appropriately.     These findings favor a benign lymph node.     ** Electronically signed out by Bony Marley MD **  ___________________________________________________________________________________________________    Derm Consult: [id number]   (BX: 10/08/2024     3 SLIDES, [facility], #[id number] (BX: 10/08/2024)     SKIN, RIGHT SUPERIOR UPPER BACK, SHAVE BIOPSY:  MALIGNANT MELANOMA, BRESLOW THICKNESS AT LEAST 1.2 MM, PRESENT ON THE DEEP AND PERIPHERAL MARGIN, SEE NOTE.     Note:  Microscopic examination reveals a specimen that  extends into the superficial to mid dermis.  There is an asymmetric and poorly circumscribed proliferation of nested and single atypical melanocytes throughout all layers of the epidermis.  There are nests of atypical melanocytes in the dermis that have moderately  enlarged nuclei with moderate cytoplasm.  There are areas of papillary dermal edema with a lymphocytic infiltrate and edema.  The melanocytes stain with antibodies against SOX-10 and PRAME.       ** Electronically signed out by Bony Marley MD **    SPECIMEN   Procedure  Biopsy, shave   Specimen Laterality  Right   TUMOR   Tumor Site  Skin of trunk: Right superior upper back        Histologic Type  Superficial spreading melanoma (low-cumulative sun damage (CSD) melanoma)   Maximum Tumor (Breslow) Thickness (Millimeters)  At least: 1.2  mm     Moderately transected on the deep margin.   Ulceration  Not identified   Anatomic (Christoph) Level  At least level: IV     Moderately transected on the deep margin.   Mitotic Rate  None identified   Microsatellite(s)  Not identified   Lymphovascular Invasion  Not identified   Neurotropism  Not identified   Tumor-Infiltrating Lymphocytes  Present, nonbrisk   Tumor Regression  Present   MARGINS     Margin Status for Invasive Melanoma  Invasive melanoma present at margin   Margin(s) Involved by Invasive Melanoma  Deep   Margin Status for Melanoma in situ  Melanoma in situ present at margin   Margin(s) Involved by Melanoma in Situ  Peripheral     Deep   PATHOLOGIC STAGE CLASSIFICATION (pTNM, AJCC 8th Edition)     pT Category  pT2a     Radiology      Clinical Images  10/24/2024

## 2024-11-18 NOTE — PROGRESS NOTES
ASSESSMENT AND PLAN  Obie Mabry is a 59 y.o. male who is now s/p wide local excision and sentinel lymph node biopsy on 11/4/24. On final pathology margins were clear and node benign, pT2aN0, stage 1B. We discussed that this is great news and does not require further workup or follow-up with us at this time. We advised dermatology follow-up for regular exams and to return to our clinic with any concerns in the future. The patient expressed understanding.      Rayray Piedra MD  Assistant Professor of Surgery  Division of Surgical Oncology  [phone]  [email or web address]        SUBJECTIVE  Obie Mabry is a 59 y.o. male who presents for a postoperative clinic visit.  Referring provider: Salvador Delgado  Diagnosis: melanoma  Location: right upper back  Thickness: at least 1.2 mm  Surgery: Wide local excision and sentinel lymph node biopsy on 11/4/24  Postoperative issues: none      Physical exam  Incisions are clean, dry, and intact    Surgical Pathology  FINAL DIAGNOSIS   A. SKIN, BACK (3X9 CM), WIDE EXCISION:  ATYPICAL MELANOCYTIC HYPERPLASIA, INKED MARGINS FREE IN THE PLANES OF SECTIONS EXAMINED, SEE NOTE.     Note: Microscopic examination reveals a specimen that extends into the subcutaneous fat. An area with horizontally oriented collagen and vertically oriented vessels is present. In the immediately adjacent epidermis in slides A10 through A14 there is a proliferation of nested and single atypical melanocytes along the dermal-epidermal junction with bridging of adjacent nests of melanocytes. Step sections were performed.     While this specimen has architectural features of a dysplastic junctional nevus, the findings likely represent residual melanoma in situ.     B. NODE, RIGHT AXILLARY SENTINEL LYMPH NODE #1 ([id number]), EXCISION:  FOCAL SOX-10 POSITIVE STAINING, SEE NOTE.     Note: Microscopic examination reveals an enlarged lymph node. In the parenchymal area there are spindled  SOX-10 positive cells that appear to be adjacent to a blood vessel. Immediately adjacent to this area there is an area of SOX-10 positive staining that partially overlaps a nucleus and seems to extend beyond the nucleus. Staining is only seen on one section of the SOX-10 in this area and is not seen on a Melan-A or HMB4 stain. All control slides stain appropriately.     These findings favor a benign lymph node.

## 2024-11-19 ENCOUNTER — OFFICE VISIT (OUTPATIENT)
Dept: SURGICAL ONCOLOGY | Facility: CLINIC | Age: 59
End: 2024-11-19
Payer: COMMERCIAL

## 2024-11-19 VITALS
RESPIRATION RATE: 18 BRPM | BODY MASS INDEX: 41.57 KG/M2 | TEMPERATURE: 97 F | OXYGEN SATURATION: 95 % | DIASTOLIC BLOOD PRESSURE: 57 MMHG | SYSTOLIC BLOOD PRESSURE: 146 MMHG | HEART RATE: 91 BPM | WEIGHT: 273.37 LBS

## 2024-11-19 DIAGNOSIS — C43.59 MELANOMA OF BACK (MULTI): Primary | ICD-10-CM

## 2024-11-19 PROCEDURE — 3078F DIAST BP <80 MM HG: CPT | Performed by: STUDENT IN AN ORGANIZED HEALTH CARE EDUCATION/TRAINING PROGRAM

## 2024-11-19 PROCEDURE — 99211 OFF/OP EST MAY X REQ PHY/QHP: CPT | Performed by: STUDENT IN AN ORGANIZED HEALTH CARE EDUCATION/TRAINING PROGRAM

## 2024-11-19 PROCEDURE — 3077F SYST BP >= 140 MM HG: CPT | Performed by: STUDENT IN AN ORGANIZED HEALTH CARE EDUCATION/TRAINING PROGRAM

## 2024-11-19 ASSESSMENT — PAIN SCALES - GENERAL: PAINLEVEL_OUTOF10: 8

## 2024-11-19 NOTE — LETTER
November 19, 2024     Salvador Delgado MD  [address]  [address]  [facility] [address]    Patient: Obie Mabry   YOB: 1965   Date of Visit: 11/19/2024       Dear Dr. Salvador Delgado MD:    Thank you for referring Obie Mabry to me for evaluation. Below are my notes for this consultation.  If you have questions, please do not hesitate to call me. I look forward to following your patient along with you.       Sincerely,     Rayray Piedra MD      CC: No Recipients  ______________________________________________________________________________________    ASSESSMENT AND PLAN  Obie Mabry is a 59 y.o. male who is now s/p wide local excision and sentinel lymph node biopsy on 11/4/24. On final pathology margins were clear and node benign, pT2aN0, stage 1B. We discussed that this is great news and does not require further workup or follow-up with us at this time. We advised dermatology follow-up for regular exams and to return to our clinic with any concerns in the future. The patient expressed understanding.      Rayray Piedra MD  Assistant Professor of Surgery  Division of Surgical Oncology  [phone]  [email or web address]        SUBJECTIVE  Obie Mabry is a 59 y.o. male who presents for a postoperative clinic visit.  Referring provider: Salvador Delgado  Diagnosis: melanoma  Location: right upper back  Thickness: at least 1.2 mm  Surgery: Wide local excision and sentinel lymph node biopsy on 11/4/24  Postoperative issues: none      Physical exam  Incisions are clean, dry, and intact    Surgical Pathology  FINAL DIAGNOSIS   A. SKIN, BACK (3X9 CM), WIDE EXCISION:  ATYPICAL MELANOCYTIC HYPERPLASIA, INKED MARGINS FREE IN THE PLANES OF SECTIONS EXAMINED, SEE NOTE.     Note: Microscopic examination reveals a specimen that extends into the subcutaneous fat. An area with horizontally oriented collagen and vertically oriented vessels is present. In the immediately adjacent  epidermis in slides A10 through A14 there is a proliferation of nested and single atypical melanocytes along the dermal-epidermal junction with bridging of adjacent nests of melanocytes. Step sections were performed.     While this specimen has architectural features of a dysplastic junctional nevus, the findings likely represent residual melanoma in situ.     B. NODE, RIGHT AXILLARY SENTINEL LYMPH NODE #1 ([id number]), EXCISION:  FOCAL SOX-10 POSITIVE STAINING, SEE NOTE.     Note: Microscopic examination reveals an enlarged lymph node. In the parenchymal area there are spindled SOX-10 positive cells that appear to be adjacent to a blood vessel. Immediately adjacent to this area there is an area of SOX-10 positive staining that partially overlaps a nucleus and seems to extend beyond the nucleus. Staining is only seen on one section of the SOX-10 in this area and is not seen on a Melan-A or HMB4 stain. All control slides stain appropriately.     These findings favor a benign lymph node.

## 2025-05-18 ENCOUNTER — HOSPITAL ENCOUNTER (EMERGENCY)
Facility: HOSPITAL | Age: 60
Discharge: HOME | End: 2025-05-18
Payer: COMMERCIAL

## 2025-05-18 VITALS
SYSTOLIC BLOOD PRESSURE: 144 MMHG | HEIGHT: 67 IN | RESPIRATION RATE: 18 BRPM | WEIGHT: 273 LBS | HEART RATE: 70 BPM | BODY MASS INDEX: 42.85 KG/M2 | DIASTOLIC BLOOD PRESSURE: 87 MMHG | OXYGEN SATURATION: 98 % | TEMPERATURE: 98.6 F

## 2025-05-18 DIAGNOSIS — M54.41 ACUTE RIGHT-SIDED LOW BACK PAIN WITH RIGHT-SIDED SCIATICA: Primary | ICD-10-CM

## 2025-05-18 PROCEDURE — 2500000004 HC RX 250 GENERAL PHARMACY W/ HCPCS (ALT 636 FOR OP/ED): Mod: JZ | Performed by: CLINICAL NURSE SPECIALIST

## 2025-05-18 PROCEDURE — 96372 THER/PROPH/DIAG INJ SC/IM: CPT | Performed by: CLINICAL NURSE SPECIALIST

## 2025-05-18 PROCEDURE — 2500000005 HC RX 250 GENERAL PHARMACY W/O HCPCS: Performed by: CLINICAL NURSE SPECIALIST

## 2025-05-18 PROCEDURE — 99284 EMERGENCY DEPT VISIT MOD MDM: CPT

## 2025-05-18 RX ORDER — ORPHENADRINE CITRATE 30 MG/ML
60 INJECTION INTRAMUSCULAR; INTRAVENOUS ONCE
Status: COMPLETED | OUTPATIENT
Start: 2025-05-18 | End: 2025-05-18

## 2025-05-18 RX ORDER — LIDOCAINE 50 MG/G
1 PATCH TOPICAL DAILY
Qty: 7 PATCH | Refills: 0 | Status: SHIPPED | OUTPATIENT
Start: 2025-05-18 | End: 2025-05-18

## 2025-05-18 RX ORDER — LIDOCAINE 50 MG/G
1 PATCH TOPICAL DAILY
Qty: 7 PATCH | Refills: 0 | Status: SHIPPED | OUTPATIENT
Start: 2025-05-18 | End: 2025-05-25

## 2025-05-18 RX ORDER — KETOROLAC TROMETHAMINE 15 MG/ML
15 INJECTION, SOLUTION INTRAMUSCULAR; INTRAVENOUS ONCE
Status: COMPLETED | OUTPATIENT
Start: 2025-05-18 | End: 2025-05-18

## 2025-05-18 RX ORDER — LIDOCAINE 560 MG/1
1 PATCH PERCUTANEOUS; TOPICAL; TRANSDERMAL ONCE
Status: DISCONTINUED | OUTPATIENT
Start: 2025-05-18 | End: 2025-05-18 | Stop reason: HOSPADM

## 2025-05-18 RX ORDER — METHYLPREDNISOLONE 4 MG/1
TABLET ORAL
Qty: 21 TABLET | Refills: 0 | Status: SHIPPED | OUTPATIENT
Start: 2025-05-18 | End: 2025-05-25

## 2025-05-18 RX ORDER — METHOCARBAMOL 500 MG/1
500 TABLET, FILM COATED ORAL 2 TIMES DAILY
Qty: 10 TABLET | Refills: 0 | Status: SHIPPED | OUTPATIENT
Start: 2025-05-18 | End: 2025-05-18

## 2025-05-18 RX ORDER — METHYLPREDNISOLONE 4 MG/1
TABLET ORAL
Qty: 21 TABLET | Refills: 0 | Status: SHIPPED | OUTPATIENT
Start: 2025-05-18 | End: 2025-05-18

## 2025-05-18 RX ORDER — PREDNISONE 20 MG/1
60 TABLET ORAL ONCE
Status: COMPLETED | OUTPATIENT
Start: 2025-05-18 | End: 2025-05-18

## 2025-05-18 RX ORDER — METHOCARBAMOL 500 MG/1
500 TABLET, FILM COATED ORAL 2 TIMES DAILY
Qty: 10 TABLET | Refills: 0 | Status: SHIPPED | OUTPATIENT
Start: 2025-05-18 | End: 2025-05-23

## 2025-05-18 RX ADMIN — LIDOCAINE 4% 1 PATCH: 40 PATCH TOPICAL at 10:14

## 2025-05-18 RX ADMIN — PREDNISONE 60 MG: 20 TABLET ORAL at 10:13

## 2025-05-18 RX ADMIN — KETOROLAC TROMETHAMINE 15 MG: 15 INJECTION, SOLUTION INTRAMUSCULAR; INTRAVENOUS at 10:14

## 2025-05-18 RX ADMIN — ORPHENADRINE CITRATE 60 MG: 60 INJECTION INTRAMUSCULAR; INTRAVENOUS at 10:14

## 2025-05-18 ASSESSMENT — COLUMBIA-SUICIDE SEVERITY RATING SCALE - C-SSRS
6. HAVE YOU EVER DONE ANYTHING, STARTED TO DO ANYTHING, OR PREPARED TO DO ANYTHING TO END YOUR LIFE?: NO
2. HAVE YOU ACTUALLY HAD ANY THOUGHTS OF KILLING YOURSELF?: NO
1. IN THE PAST MONTH, HAVE YOU WISHED YOU WERE DEAD OR WISHED YOU COULD GO TO SLEEP AND NOT WAKE UP?: NO

## 2025-05-18 ASSESSMENT — PAIN - FUNCTIONAL ASSESSMENT: PAIN_FUNCTIONAL_ASSESSMENT: 0-10

## 2025-05-18 ASSESSMENT — PAIN DESCRIPTION - LOCATION: LOCATION: BACK

## 2025-05-18 ASSESSMENT — PAIN SCALES - GENERAL: PAINLEVEL_OUTOF10: 10 - WORST POSSIBLE PAIN

## 2025-05-18 ASSESSMENT — PAIN DESCRIPTION - ORIENTATION: ORIENTATION: LOWER
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
## 2025-05-18 NOTE — ED TRIAGE NOTES
Back pain started yesterday evening. Terrible pain across the bottom of the back. Has had something like this before

## 2025-05-18 NOTE — DISCHARGE INSTRUCTIONS
Light stretching  Continue with your Tylenol Motrin for pain do not go the recommended daily dosage if you are taking the Motrin take it with food  Risk and benefits of steroids reviewed with you take it with food to help with stomach upset absorption hold your Motrin while taking steroid  Lidocaine patches over-the-counter as directed leave on for 12 hours remove for 12 hours  Alternate ice and heat to help with pain 20 minutes at a time ice 20 minutes at a time after activity  Try a TENS unit  Follow-up with primary care physician in 2 days for evaluation  Follow-up with orthopedics if no improvement referral provided

## 2025-05-18 NOTE — Clinical Note
Obie Mabry was seen and treated in our emergency department on 5/18/2025.  He may return to work on 05/20/2025.  1-3 days if needed . NO lifting or pulling for 1 week      If you have any questions or concerns, please don't hesitate to call.      Cristel Mack, APRN-CNP

## (undated) DEVICE — APPLICATOR, CHLORAPREP, W/ORANGE TINT, 26ML

## (undated) DEVICE — HEMOSTAT, ARISTA, ABSORBABLE, 3 GRAMS

## (undated) DEVICE — CAUTERY, PENCIL, PUSH BUTTON, SMOKE EVAC, 70MM

## (undated) DEVICE — SUTURE, VICRYL PLUS 3-0, SH, 27IN

## (undated) DEVICE — STRAP, ARM BOARD, 32 X 1.5

## (undated) DEVICE — PROTECTOR, NERVE, ULNAR, PINK

## (undated) DEVICE — GLOVE, SURGICAL, PROTEXIS PI , 7.5, PF, LF

## (undated) DEVICE — Device

## (undated) DEVICE — DRAPE, SHEET, LAPAROTOMY, W/ISO-BAC, W/ARMBOARD COVERS, 98 X 122 IN, DISPOSABLE, LF, STERILE

## (undated) DEVICE — MARKER, SKIN, W/ RULER, LF, STERILE

## (undated) DEVICE — APPLIER,  LIGACLIP MULTI CLIP, 30 MED 11 1/2

## (undated) DEVICE — NEEDLE, SAFETY, 25 GA X 1.5 IN

## (undated) DEVICE — SUTURE, MONOCRYL, 4-0, 27 IN, PS-2, UNDYED

## (undated) DEVICE — STRAP, VELCRO, BODY, 4 X 60IN, NS

## (undated) DEVICE — GLOVE, SURGICAL, PROTEXIS PI BLUE W/NEUTHERA, 8.0, PF, LF

## (undated) DEVICE — SUTURE, VICRYL, 2-0, 27 IN, X-1, UNDYED

## (undated) DEVICE — COVER, TRANSDUCER, NEO GUARD, 4 X 30CM, LF

## (undated) DEVICE — ELECTRODE, ELECTROSURGICAL, BLADE, INSULATED, ENT/IMA, STERILE

## (undated) DEVICE — DRAPE, INCISE, ANTIMICROBIAL, IOBAN 2, LARGE, 17 X 23 IN, DISPOSABLE, STERILE

## (undated) DEVICE — TOWEL PACK, STERILE, 16X24, XRAY DETECTABLE, BLUE, 4/PK

## (undated) DEVICE — SYRINGE, 10 CC, LUER LOCK

## (undated) DEVICE — ADHESIVE, SKIN, DERMABOND ADVANCED, 15CM, PEN-STYLE